# Patient Record
Sex: MALE
[De-identification: names, ages, dates, MRNs, and addresses within clinical notes are randomized per-mention and may not be internally consistent; named-entity substitution may affect disease eponyms.]

---

## 2019-04-09 PROBLEM — Z00.00 ENCOUNTER FOR PREVENTIVE HEALTH EXAMINATION: Status: ACTIVE | Noted: 2019-04-09

## 2019-05-28 ENCOUNTER — APPOINTMENT (OUTPATIENT)
Dept: PULMONOLOGY | Facility: CLINIC | Age: 59
End: 2019-05-28
Payer: COMMERCIAL

## 2019-05-28 VITALS
RESPIRATION RATE: 15 BRPM | HEIGHT: 70 IN | DIASTOLIC BLOOD PRESSURE: 84 MMHG | HEART RATE: 81 BPM | TEMPERATURE: 97.5 F | BODY MASS INDEX: 26.92 KG/M2 | OXYGEN SATURATION: 97 % | SYSTOLIC BLOOD PRESSURE: 120 MMHG | WEIGHT: 188 LBS

## 2019-05-28 DIAGNOSIS — Z82.49 FAMILY HISTORY OF ISCHEMIC HEART DISEASE AND OTHER DISEASES OF THE CIRCULATORY SYSTEM: ICD-10-CM

## 2019-05-28 DIAGNOSIS — Z83.438 FAMILY HISTORY OF OTHER DISORDER OF LIPOPROTEIN METABOLISM AND OTHER LIPIDEMIA: ICD-10-CM

## 2019-05-28 PROCEDURE — ZZZZZ: CPT

## 2019-05-28 PROCEDURE — 94729 DIFFUSING CAPACITY: CPT

## 2019-05-28 PROCEDURE — 94060 EVALUATION OF WHEEZING: CPT

## 2019-05-28 PROCEDURE — 99204 OFFICE O/P NEW MOD 45 MIN: CPT | Mod: 25

## 2019-05-28 PROCEDURE — 94726 PLETHYSMOGRAPHY LUNG VOLUMES: CPT

## 2019-05-28 RX ORDER — FLUTICASONE PROPIONATE 50 UG/1
50 SPRAY, METERED NASAL
Qty: 1 | Refills: 5 | Status: ACTIVE | COMMUNITY
Start: 2019-05-28 | End: 1900-01-01

## 2019-05-28 RX ORDER — ALBUTEROL SULFATE 90 UG/1
108 (90 BASE) AEROSOL, METERED RESPIRATORY (INHALATION)
Qty: 1 | Refills: 2 | Status: ACTIVE | COMMUNITY
Start: 2019-05-28 | End: 1900-01-01

## 2019-05-29 ENCOUNTER — RECORD ABSTRACTING (OUTPATIENT)
Age: 59
End: 2019-05-29

## 2019-05-29 PROBLEM — Z82.49 FAMILY HISTORY OF HYPERTENSION: Status: ACTIVE | Noted: 2019-05-29

## 2019-05-29 PROBLEM — Z83.438 FAMILY HISTORY OF HYPERLIPIDEMIA: Status: ACTIVE | Noted: 2019-05-29

## 2019-05-29 NOTE — PHYSICAL EXAM
[General Appearance - Well Developed] : well developed [Normal Appearance] : normal appearance [General Appearance - Well Nourished] : well nourished [Normal Conjunctiva] : the conjunctiva exhibited no abnormalities [Neck Appearance] : the appearance of the neck was normal [Heart Rate And Rhythm] : heart rate and rhythm were normal [Heart Sounds] : normal S1 and S2 [Murmurs] : no murmurs present [] : no respiratory distress [Auscultation Breath Sounds / Voice Sounds] : lungs were clear to auscultation bilaterally [Bowel Sounds] : normal bowel sounds [Abdomen Soft] : soft [Abdomen Tenderness] : non-tender [Abnormal Walk] : normal gait [Nail Clubbing] : no clubbing of the fingernails [Cyanosis, Localized] : no localized cyanosis [Skin Color & Pigmentation] : normal skin color and pigmentation [Skin Turgor] : normal skin turgor [Motor Exam] : the motor exam was normal [No Focal Deficits] : no focal deficits [Oriented To Time, Place, And Person] : oriented to person, place, and time [Mood] : the mood was normal

## 2019-06-01 NOTE — REVIEW OF SYSTEMS
[Cough] : cough [Dyspnea] : dyspnea [Wheezing] : wheezing [Heartburn] : heartburn [Negative] : Endocrine [Vomiting] : no vomiting [Nausea] : no nausea

## 2019-06-01 NOTE — HISTORY OF PRESENT ILLNESS
[FreeTextEntry1] :  #[id number]\par 58M hx ?asthma, GERD, who comes for initial evaluation. Most recently given diagnosis of asthma. He started having SOB in March with cough and occasional wheezing. Seen at [facility] on 3/18 and given diagnosis of asthma. Treated with proair, zpack, prednisone, nebulizers. He continued to have worsening SOB and MOREL. He went to [facility] on 5/2-5 for presumed asthma exacerbation and was treated with steroids. CT Chest reportedly negative  Discharged with symbicort, montelukast, and prednisone (finished 10 days ago). Now feeling a little better but continues to have some SOB and feels that he cannot take in a deep breath, coughing. Triggers include change in temperature. Has some postnasal drip. Did not have issues with breathing prior to this year. Smoked 1/2 ppd x 30 years.

## 2019-06-01 NOTE — ASSESSMENT
[FreeTextEntry1] : 58M hx ?asthma, GERD who comes for initial evaluation. Since March 2019 has gone through 2 courses of steroids for SOB. Now on montelukast and symbicort. Continues to have coughing and some SOB. +GERD symptoms. Unclear what caused his symptoms. PFTs do not show evidence obstructive disease though he took his symbicort prior to testing. It would be unusual to have asthma at this age - pt c/o allergy and GERD symptoms so perhaps this is contributing to his symptoms of SOB. \par - refill symbicort 80-4.5, will try to continue to step down especially when allergy season ends\par - refill montelukast\par - refill flonase\par - will give albuterol prn\par - omeprazole\par - reviewed inhaler technique with patient, provided spacer\par - pt will try and obtain CT chest from [facility]\par \par d/w Dr. Aguilar

## 2019-06-24 ENCOUNTER — RX CHANGE (OUTPATIENT)
Age: 59
End: 2019-06-24

## 2019-06-24 RX ORDER — OMEPRAZOLE 40 MG/1
40 CAPSULE, DELAYED RELEASE ORAL
Qty: 90 | Refills: 0 | Status: ACTIVE | COMMUNITY
Start: 2019-05-28 | End: 1900-01-01

## 2019-07-30 ENCOUNTER — RX CHANGE (OUTPATIENT)
Age: 59
End: 2019-07-30

## 2019-08-08 ENCOUNTER — APPOINTMENT (OUTPATIENT)
Dept: PULMONOLOGY | Facility: CLINIC | Age: 59
End: 2019-08-08

## 2019-10-21 ENCOUNTER — RX CHANGE (OUTPATIENT)
Age: 59
End: 2019-10-21

## 2019-12-03 ENCOUNTER — APPOINTMENT (OUTPATIENT)
Dept: PULMONOLOGY | Facility: CLINIC | Age: 59
End: 2019-12-03
Payer: COMMERCIAL

## 2019-12-03 VITALS
HEIGHT: 70 IN | TEMPERATURE: 97.4 F | SYSTOLIC BLOOD PRESSURE: 127 MMHG | WEIGHT: 200 LBS | BODY MASS INDEX: 28.63 KG/M2 | DIASTOLIC BLOOD PRESSURE: 85 MMHG | RESPIRATION RATE: 15 BRPM | OXYGEN SATURATION: 94 % | HEART RATE: 83 BPM

## 2019-12-03 PROCEDURE — 90686 IIV4 VACC NO PRSV 0.5 ML IM: CPT

## 2019-12-03 PROCEDURE — G0008: CPT

## 2019-12-03 PROCEDURE — 99214 OFFICE O/P EST MOD 30 MIN: CPT | Mod: 25

## 2019-12-03 NOTE — HISTORY OF PRESENT ILLNESS
[FreeTextEntry1] : \par  59-year-old male with a recent diagnosis of asthma. He also has GERD and is a former tobacco user half pack a day for 30 years. He was initially evaluated by me in May of 2019. At that time he was treated with Symbicort, omeprazole albuterol fluticasone and montelukast.\par \par PFTs were normal.\par \par Returns for followup today. He is feeling very well on the Symbicort. He denies any recent asthma exacerbations. He denies cough wheezing or shortness of breath. He has used his rescue inhaler 2 or 3 times in 6 months. Not having any allergy symptoms currently he denies GERD.

## 2019-12-03 NOTE — ASSESSMENT
[FreeTextEntry1] : 59 year old male with asthma, former tobacco user, normal PFTs, asthma currently well controlled.  \par \par Plan:\par \par 1- Renewed Symbicort, montelukast and proair\par 2- F/U in 6 months\par 3- flu vaccine given today.\par \par ON follow up will discuss wether patient qualifies for lung cancer screening CT Chest.

## 2019-12-03 NOTE — REVIEW OF SYSTEMS
[As Noted in HPI] : as noted in HPI [Cough] : no cough [Dyspnea] : no dyspnea [Wheezing] : no wheezing [Heartburn] : no heartburn [Vomiting] : no vomiting [Nausea] : no nausea [Negative] : Gastrointestinal

## 2019-12-03 NOTE — PHYSICAL EXAM
[General Appearance - Well Developed] : well developed [General Appearance - Well Nourished] : well nourished [Normal Appearance] : normal appearance [Normal Conjunctiva] : the conjunctiva exhibited no abnormalities [Neck Appearance] : the appearance of the neck was normal [Heart Sounds] : normal S1 and S2 [Murmurs] : no murmurs present [Heart Rate And Rhythm] : heart rate and rhythm were normal [] : no respiratory distress [Auscultation Breath Sounds / Voice Sounds] : lungs were clear to auscultation bilaterally [Bowel Sounds] : normal bowel sounds [Abdomen Tenderness] : non-tender [Abdomen Soft] : soft [Cyanosis, Localized] : no localized cyanosis [Nail Clubbing] : no clubbing of the fingernails [Abnormal Walk] : normal gait [Motor Exam] : the motor exam was normal [Oriented To Time, Place, And Person] : oriented to person, place, and time [No Focal Deficits] : no focal deficits [Mood] : the mood was normal [Skin Color & Pigmentation] : normal skin color and pigmentation [Skin Turgor] : normal skin turgor [III] : III

## 2020-06-24 ENCOUNTER — APPOINTMENT (OUTPATIENT)
Dept: PULMONOLOGY | Facility: CLINIC | Age: 60
End: 2020-06-24

## 2020-07-20 RX ORDER — MONTELUKAST 10 MG/1
10 TABLET, FILM COATED ORAL
Qty: 1 | Refills: 0 | Status: ACTIVE | COMMUNITY
Start: 1900-01-01 | End: 1900-01-01

## 2020-07-21 RX ORDER — MONTELUKAST 10 MG/1
10 TABLET, FILM COATED ORAL
Qty: 1 | Refills: 3 | Status: ACTIVE | COMMUNITY
Start: 2019-05-28 | End: 1900-01-01

## 2020-08-20 ENCOUNTER — APPOINTMENT (OUTPATIENT)
Dept: PULMONOLOGY | Facility: CLINIC | Age: 60
End: 2020-08-20
Payer: COMMERCIAL

## 2020-08-20 DIAGNOSIS — Z87.891 PERSONAL HISTORY OF NICOTINE DEPENDENCE: ICD-10-CM

## 2020-08-20 DIAGNOSIS — J45.909 UNSPECIFIED ASTHMA, UNCOMPLICATED: ICD-10-CM

## 2020-08-20 DIAGNOSIS — J30.9 ALLERGIC RHINITIS, UNSPECIFIED: ICD-10-CM

## 2020-08-20 PROCEDURE — 99214 OFFICE O/P EST MOD 30 MIN: CPT | Mod: 95

## 2020-08-20 RX ORDER — BUDESONIDE AND FORMOTEROL FUMARATE DIHYDRATE 80; 4.5 UG/1; UG/1
80-4.5 AEROSOL RESPIRATORY (INHALATION) TWICE DAILY
Qty: 3 | Refills: 2 | Status: ACTIVE | COMMUNITY
Start: 2019-12-03 | End: 1900-01-01

## 2020-08-20 NOTE — PHYSICAL EXAM
[General Appearance - Well Developed] : well developed [Normal Appearance] : normal appearance [General Appearance - Well Nourished] : well nourished [Normal Conjunctiva] : the conjunctiva exhibited no abnormalities [III] : III [Neck Appearance] : the appearance of the neck was normal [Heart Rate And Rhythm] : heart rate and rhythm were normal [Heart Sounds] : normal S1 and S2 [Murmurs] : no murmurs present [Auscultation Breath Sounds / Voice Sounds] : lungs were clear to auscultation bilaterally [] : no respiratory distress [Abdomen Soft] : soft [Bowel Sounds] : normal bowel sounds [Abnormal Walk] : normal gait [Abdomen Tenderness] : non-tender [Nail Clubbing] : no clubbing of the fingernails [Cyanosis, Localized] : no localized cyanosis [Motor Exam] : the motor exam was normal [No Focal Deficits] : no focal deficits [Oriented To Time, Place, And Person] : oriented to person, place, and time [Mood] : the mood was normal [Skin Turgor] : normal skin turgor [Skin Color & Pigmentation] : normal skin color and pigmentation

## 2020-08-20 NOTE — HISTORY OF PRESENT ILLNESS
[FreeTextEntry1] : \par  59-year-old male with a recent diagnosis of asthma/bronchitis. He also has GERD and is a former tobacco userone pack a day for 35 years. He quit 10 years ago.  History obtained through his daughter who helped with translation.  \par \par He was initially evaluated by me in May of 2019. At that time he was treated with Symbicort, omeprazole albuterol fluticasone and montelukast.\par \par PFTs were normal.\par \par Felt much better on follow up a few months later- last seen on 12/3/19.  .  \par \par He is feeling well- no recent exacerbations.  NO issues during COVID.  Rarely using rescue inhaler, allergies controlled.  Complaining of joint pain- asking if it is related to any of his meds.

## 2020-08-20 NOTE — REVIEW OF SYSTEMS
[As Noted in HPI] : as noted in HPI [Negative] : Endocrine [Cough] : no cough [Wheezing] : no wheezing [Dyspnea] : no dyspnea [Heartburn] : no heartburn [Nausea] : no nausea [Vomiting] : no vomiting

## 2020-08-20 NOTE — REASON FOR VISIT
[Initial Evaluation] : an initial evaluation [Home] : at home, [unfilled] , at the time of the visit. [Medical Office: ([facility])___] : at the medical office located in  [Family Member] : family member [Verbal consent obtained from patient] : the patient, [unfilled] [Follow-Up] : a follow-up visit [COPD] : COPD

## 2020-08-20 NOTE — ASSESSMENT
[FreeTextEntry1] : 59 year old male with asthma vs COPD-, former tobacco user, normal PFTs, He is stable from the respiratory standpoint at this time.  \par \par Plan:\par \par 1- Renewed Symbicort, montelukast and proair\par 2- F/U in 6 months\par 3-Advised to receive flu vaccine\par 4- He qualifies for lung cancer screening.  We discussed pros/cons of exam and he agrees to participate.  ORder placed.  Given [facility] imaging scheduling number to daughter.  \par

## 2020-11-18 ENCOUNTER — NON-APPOINTMENT (OUTPATIENT)
Age: 60
End: 2020-11-18

## 2020-11-18 VITALS — BODY MASS INDEX: 26.48 KG/M2 | HEIGHT: 70 IN | WEIGHT: 185 LBS

## 2020-11-18 NOTE — HISTORY OF PRESENT ILLNESS
[TextBox_13] : Referred by Dr. Alyson Aguilar.\par \par Mr. OLIVARES is a 60 year old male with a history of asthma.\par \par He was called to review eligibility for Low-Dose CT lung cancer screening.  Reviewed and confirmed that the patient meets screening eligibility criteria:\par \par 60 years old \par \par Smoking Status: Former smoker\par \par Number of pack(s) per day: 1\par Number of years smoked: 35\par Number of pack years smokin\par \par Number of years since quitting smoking: 10\par Quit year: \par \par Mr. OLIVARES denies any symptoms of lung cancer, including new cough, change in cough, hemoptysis, and unintentional weight loss.\par \par Mr. OLIVARES denies any personal history of lung cancer.  No lung cancer in a first degree relative.

## 2020-11-23 ENCOUNTER — RESULT REVIEW (OUTPATIENT)
Age: 60
End: 2020-11-23

## 2020-11-23 ENCOUNTER — APPOINTMENT (OUTPATIENT)
Dept: CT IMAGING | Facility: CLINIC | Age: 60
End: 2020-11-23
Payer: COMMERCIAL

## 2020-11-23 ENCOUNTER — OUTPATIENT (OUTPATIENT)
Dept: OUTPATIENT SERVICES | Facility: HOSPITAL | Age: 60
LOS: 1 days | End: 2020-11-23
Payer: MEDICAID

## 2020-11-23 DIAGNOSIS — Z87.891 PERSONAL HISTORY OF NICOTINE DEPENDENCE: ICD-10-CM

## 2020-11-23 PROCEDURE — 71271 CT THORAX LUNG CANCER SCR C-: CPT

## 2020-11-23 PROCEDURE — G0297: CPT | Mod: 26

## 2020-11-27 ENCOUNTER — NON-APPOINTMENT (OUTPATIENT)
Age: 60
End: 2020-11-27

## 2021-02-19 ENCOUNTER — RX RENEWAL (OUTPATIENT)
Age: 61
End: 2021-02-19

## 2021-02-19 RX ORDER — BUDESONIDE AND FORMOTEROL FUMARATE DIHYDRATE 80; 4.5 UG/1; UG/1
80-4.5 AEROSOL RESPIRATORY (INHALATION) TWICE DAILY
Qty: 1 | Refills: 1 | Status: ACTIVE | COMMUNITY
Start: 2021-02-19 | End: 1900-01-01

## 2021-03-16 ENCOUNTER — NON-APPOINTMENT (OUTPATIENT)
Age: 61
End: 2021-03-16

## 2021-04-14 ENCOUNTER — RX RENEWAL (OUTPATIENT)
Age: 61
End: 2021-04-14

## 2021-04-19 ENCOUNTER — RX RENEWAL (OUTPATIENT)
Age: 61
End: 2021-04-19

## 2021-06-29 ENCOUNTER — APPOINTMENT (OUTPATIENT)
Dept: PULMONOLOGY | Facility: CLINIC | Age: 61
End: 2021-06-29
Payer: COMMERCIAL

## 2021-06-29 VITALS
TEMPERATURE: 97.9 F | OXYGEN SATURATION: 98 % | DIASTOLIC BLOOD PRESSURE: 83 MMHG | HEART RATE: 90 BPM | BODY MASS INDEX: 27.35 KG/M2 | HEIGHT: 70 IN | WEIGHT: 191 LBS | SYSTOLIC BLOOD PRESSURE: 125 MMHG

## 2021-06-29 PROCEDURE — G0296 VISIT TO DETERM LDCT ELIG: CPT

## 2021-06-29 PROCEDURE — 99214 OFFICE O/P EST MOD 30 MIN: CPT | Mod: 25

## 2021-06-29 RX ORDER — MONTELUKAST 10 MG/1
10 TABLET, FILM COATED ORAL
Qty: 30 | Refills: 5 | Status: ACTIVE | COMMUNITY
Start: 2019-12-03 | End: 1900-01-01

## 2021-06-29 RX ORDER — ALBUTEROL SULFATE 90 UG/1
108 (90 BASE) INHALANT RESPIRATORY (INHALATION)
Qty: 2 | Refills: 5 | Status: ACTIVE | COMMUNITY
Start: 2019-12-03 | End: 1900-01-01

## 2021-06-29 NOTE — ASSESSMENT
[FreeTextEntry1] : 59 year old male with asthma vs COPD-, former tobacco user, normal PFTs, He is stable from the respiratory standpoint at this time on his current bronchodilator regimen.\par Also reports diffuse joint pain. \par ON PE VSS Lungs are clear there is no edema.\par \par Plan:\par \par 1- Renewed Symbicort, montelukast and proair\par 2- F/U in 6 months\par 3-Lung cancer screening exam ordered for NOvember 2021.

## 2021-06-29 NOTE — HISTORY OF PRESENT ILLNESS
[FreeTextEntry1] : \par  60-year-old male with a recent diagnosis of asthma/bronchitis. He also has GERD and is a former tobacco userone pack a day for 35 years. He quit 10 years ago.  History obtained through his daughter who helped with translation.  \par \par He was initially evaluated by me in May of 2019. At that time he was treated with Symbicort, omeprazole albuterol fluticasone and montelukast.\par \par PFTs were normal.\par \par Felt much better on follow up a few months later- last seen by televisit one year ago.  I renewed symbicort, montelukast and proair at that time.    \par Had lung cancer screening CT in november 2020 which was BIRADS 2.  F/U was recommended in one year.  Report as follows:  PERSONALLY REVIEWED BY ME AS WELL.\par \par Patent central airways. Mild emphysema. Calcified granulomas within left upper lobe and right middle lobe. 2 mm noncalcified nodules within the left upper lobe (series 3 image 42, 60). 2 mm nodules within the left lower lobe (series 3 images 77 and 117). Triangular fissural nodule within the right middle lobe representing benign intrapulmonary lymph node. Lungs are otherwise clear.\par \par Returns for follow up today: \par Breathing is fine- denies dyspnea, cough or wheezing.  Only has issue with change in seasons.  he is using his fluticasone at those times as well as Symbicort and montelukast.  \par Reports bilateral symmetric joint pain. \par \par Received second dose of Pfeizer vaccine in May 2021

## 2021-06-29 NOTE — PHYSICAL EXAM
[No Acute Distress] : no acute distress [Normal Appearance] : normal appearance [No Neck Mass] : no neck mass [Normal Rate/Rhythm] : normal rate/rhythm [Normal S1, S2] : normal s1, s2 [No Murmurs] : no murmurs [No Resp Distress] : no resp distress [Clear to Auscultation Bilaterally] : clear to auscultation bilaterally [No Abnormalities] : no abnormalities [Normal Gait] : normal gait [No Clubbing] : no clubbing [No Cyanosis] : no cyanosis [No Edema] : no edema [Normal Color/ Pigmentation] : normal color/ pigmentation [No Focal Deficits] : no focal deficits [Oriented x3] : oriented x3 [Normal Affect] : normal affect

## 2021-12-09 ENCOUNTER — NON-APPOINTMENT (OUTPATIENT)
Age: 61
End: 2021-12-09

## 2021-12-09 DIAGNOSIS — Z87.891 PERSONAL HISTORY OF NICOTINE DEPENDENCE: ICD-10-CM

## 2021-12-11 VITALS — WEIGHT: 191 LBS | HEIGHT: 70 IN | BODY MASS INDEX: 27.35 KG/M2

## 2021-12-11 PROBLEM — Z87.891 FORMER CIGARETTE SMOKER: Status: ACTIVE | Noted: 2020-11-18

## 2021-12-11 NOTE — HISTORY OF PRESENT ILLNESS
[TextBox_13] : Referred by Dr. Alyson Aguilar.\par \par Mr. OLIVARES is a 61 year old male with a history of asthma.\par \par He was called to review eligibility for Low-Dose CT lung cancer screening.  Reviewed and confirmed that the patient meets screening eligibility criteria:\par \par 61 years old \par \par Smoking Status: Former smoker\par \par Number of pack(s) per day: 1\par Number of years smoked: 35\par Number of pack years smokin\par \par Number of years since quitting smokin\par Quit year: \par \par Mr. OLIVARES denies any symptoms of lung cancer, including new cough, change in cough, hemoptysis, and unintentional weight loss.\par \par Mr. OLIVARES denies any personal history of lung cancer.  No lung cancer in a first degree relative.  Denies any history of lung disease or any history of occupational exposures.

## 2021-12-13 ENCOUNTER — APPOINTMENT (OUTPATIENT)
Dept: CT IMAGING | Facility: CLINIC | Age: 61
End: 2021-12-13
Payer: COMMERCIAL

## 2021-12-13 ENCOUNTER — RESULT REVIEW (OUTPATIENT)
Age: 61
End: 2021-12-13

## 2021-12-13 ENCOUNTER — OUTPATIENT (OUTPATIENT)
Dept: OUTPATIENT SERVICES | Facility: HOSPITAL | Age: 61
LOS: 1 days | End: 2021-12-13
Payer: MEDICAID

## 2021-12-13 DIAGNOSIS — Z87.891 PERSONAL HISTORY OF NICOTINE DEPENDENCE: ICD-10-CM

## 2021-12-13 PROCEDURE — 71271 CT THORAX LUNG CANCER SCR C-: CPT | Mod: 26

## 2021-12-13 PROCEDURE — 71271 CT THORAX LUNG CANCER SCR C-: CPT

## 2022-01-03 ENCOUNTER — RX RENEWAL (OUTPATIENT)
Age: 62
End: 2022-01-03

## 2022-01-06 DIAGNOSIS — Z23 ENCOUNTER FOR IMMUNIZATION: ICD-10-CM

## 2022-03-02 ENCOUNTER — RX RENEWAL (OUTPATIENT)
Age: 62
End: 2022-03-02

## 2022-06-30 ENCOUNTER — RX RENEWAL (OUTPATIENT)
Age: 62
End: 2022-06-30

## 2022-06-30 RX ORDER — MONTELUKAST 10 MG/1
10 TABLET, FILM COATED ORAL
Qty: 90 | Refills: 0 | Status: ACTIVE | COMMUNITY
Start: 2020-08-20 | End: 1900-01-01

## 2022-07-11 ENCOUNTER — RX RENEWAL (OUTPATIENT)
Age: 62
End: 2022-07-11

## 2022-07-18 ENCOUNTER — OFFICE (OUTPATIENT)
Dept: URBAN - METROPOLITAN AREA CLINIC 109 | Facility: CLINIC | Age: 62
Setting detail: OPHTHALMOLOGY
End: 2022-07-18
Payer: COMMERCIAL

## 2022-07-18 DIAGNOSIS — H40.003: ICD-10-CM

## 2022-07-18 PROCEDURE — 92133 CPTRZD OPH DX IMG PST SGM ON: CPT | Performed by: OPHTHALMOLOGY

## 2022-07-18 PROCEDURE — 92020 GONIOSCOPY: CPT | Performed by: OPHTHALMOLOGY

## 2022-07-18 PROCEDURE — 99203 OFFICE O/P NEW LOW 30 MIN: CPT | Performed by: OPHTHALMOLOGY

## 2022-07-18 PROCEDURE — 92083 EXTENDED VISUAL FIELD XM: CPT | Performed by: OPHTHALMOLOGY

## 2022-07-18 ASSESSMENT — KERATOMETRY
OD_K2POWER_DIOPTERS: 41.00
OS_K1POWER_DIOPTERS: 40.75
OD_AXISANGLE_DEGREES: 093
OD_K1POWER_DIOPTERS: 40.75
OS_AXISANGLE_DEGREES: 090
OS_K2POWER_DIOPTERS: 41.00

## 2022-07-18 ASSESSMENT — REFRACTION_AUTOREFRACTION
OD_AXIS: 074
OS_SPHERE: +0.75
OD_SPHERE: +1.75
OD_CYLINDER: -1.00

## 2022-07-18 ASSESSMENT — REFRACTION_MANIFEST
OD_SPHERE: +1.75
OD_CYLINDER: -0.50
OD_AXIS: 75
OS_SPHERE: +1.00

## 2022-07-18 ASSESSMENT — SUPERFICIAL PUNCTATE KERATITIS (SPK)
OS_SPK: T
OD_SPK: T

## 2022-07-18 ASSESSMENT — TONOMETRY
OS_IOP_MMHG: 17
OD_IOP_MMHG: 16

## 2022-07-18 ASSESSMENT — TEAR BREAK UP TIME (TBUT)
OS_TBUT: T
OD_TBUT: T

## 2022-07-18 ASSESSMENT — AXIALLENGTH_DERIVED
OD_AL: 24.0789
OD_AL: 23.9779

## 2022-07-18 ASSESSMENT — CONFRONTATIONAL VISUAL FIELD TEST (CVF)
OD_FINDINGS: FULL
OS_FINDINGS: FULL

## 2022-07-18 ASSESSMENT — SPHEQUIV_DERIVED
OD_SPHEQUIV: 1.5
OD_SPHEQUIV: 1.25

## 2022-07-18 ASSESSMENT — VISUAL ACUITY
OS_BCVA: 20/50
OD_BCVA: 20/30-2

## 2022-09-28 ENCOUNTER — RX RENEWAL (OUTPATIENT)
Age: 62
End: 2022-09-28

## 2022-11-20 ENCOUNTER — RX RENEWAL (OUTPATIENT)
Age: 62
End: 2022-11-20

## 2023-04-07 ENCOUNTER — RX RENEWAL (OUTPATIENT)
Age: 63
End: 2023-04-07

## 2023-08-15 ENCOUNTER — RX RENEWAL (OUTPATIENT)
Age: 63
End: 2023-08-15

## 2023-08-15 RX ORDER — BUDESONIDE AND FORMOTEROL FUMARATE DIHYDRATE 80; 4.5 UG/1; UG/1
80-4.5 AEROSOL RESPIRATORY (INHALATION)
Qty: 1 | Refills: 3 | Status: ACTIVE | COMMUNITY
Start: 2019-05-28 | End: 1900-01-01

## 2024-10-28 ENCOUNTER — NON-APPOINTMENT (OUTPATIENT)
Age: 64
End: 2024-10-28

## 2024-12-09 ENCOUNTER — APPOINTMENT (OUTPATIENT)
Dept: ORTHOPEDIC SURGERY | Facility: CLINIC | Age: 64
End: 2024-12-09

## 2024-12-09 VITALS — WEIGHT: 200 LBS | HEIGHT: 70 IN | BODY MASS INDEX: 28.63 KG/M2

## 2024-12-09 VITALS — BODY MASS INDEX: 28.7 KG/M2 | WEIGHT: 200 LBS

## 2024-12-11 ENCOUNTER — APPOINTMENT (OUTPATIENT)
Dept: PULMONOLOGY | Facility: CLINIC | Age: 64
End: 2024-12-11

## 2024-12-11 VITALS
RESPIRATION RATE: 16 BRPM | HEART RATE: 84 BPM | DIASTOLIC BLOOD PRESSURE: 80 MMHG | SYSTOLIC BLOOD PRESSURE: 124 MMHG | OXYGEN SATURATION: 94 % | BODY MASS INDEX: 27.44 KG/M2 | HEIGHT: 71 IN | WEIGHT: 196 LBS

## 2024-12-11 DIAGNOSIS — R91.1 SOLITARY PULMONARY NODULE: ICD-10-CM

## 2024-12-11 PROCEDURE — G2211 COMPLEX E/M VISIT ADD ON: CPT

## 2024-12-11 PROCEDURE — 99203 OFFICE O/P NEW LOW 30 MIN: CPT

## 2024-12-11 RX ORDER — GUSELKUMAB 100 MG/ML
100 INJECTION SUBCUTANEOUS
Qty: 1 | Refills: 0 | Status: ACTIVE | COMMUNITY
Start: 2024-11-15

## 2024-12-12 ENCOUNTER — APPOINTMENT (OUTPATIENT)
Dept: ORTHOPEDIC SURGERY | Facility: CLINIC | Age: 64
End: 2024-12-12
Payer: OTHER MISCELLANEOUS

## 2024-12-12 VITALS — BODY MASS INDEX: 27.44 KG/M2 | HEIGHT: 71 IN | WEIGHT: 196 LBS

## 2024-12-12 DIAGNOSIS — G57.82 OTHER SPECIFIED MONONEUROPATHIES OF LEFT LOWER LIMB: ICD-10-CM

## 2024-12-12 DIAGNOSIS — S93.602A UNSPECIFIED SPRAIN OF LEFT FOOT, INITIAL ENCOUNTER: ICD-10-CM

## 2024-12-12 DIAGNOSIS — M19.072 PRIMARY OSTEOARTHRITIS, LEFT ANKLE AND FOOT: ICD-10-CM
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
## 2024-12-12 DIAGNOSIS — M79.672 PAIN IN LEFT FOOT: ICD-10-CM

## 2024-12-12 DIAGNOSIS — M72.2 PLANTAR FASCIAL FIBROMATOSIS: ICD-10-CM

## 2024-12-12 PROCEDURE — 99204 OFFICE O/P NEW MOD 45 MIN: CPT

## 2025-01-31 ENCOUNTER — NON-APPOINTMENT (OUTPATIENT)
Age: 65
End: 2025-01-31

## 2025-03-03 ENCOUNTER — APPOINTMENT (OUTPATIENT)
Dept: ORTHOPEDIC SURGERY | Facility: CLINIC | Age: 65
End: 2025-03-03
Payer: OTHER MISCELLANEOUS

## 2025-03-03 DIAGNOSIS — M19.072 PRIMARY OSTEOARTHRITIS, LEFT ANKLE AND FOOT: ICD-10-CM

## 2025-03-03 DIAGNOSIS — M79.672 PAIN IN LEFT FOOT: ICD-10-CM

## 2025-03-03 DIAGNOSIS — S93.602A UNSPECIFIED SPRAIN OF LEFT FOOT, INITIAL ENCOUNTER: ICD-10-CM

## 2025-03-03 DIAGNOSIS — G57.82 OTHER SPECIFIED MONONEUROPATHIES OF LEFT LOWER LIMB: ICD-10-CM

## 2025-03-03 DIAGNOSIS — M72.2 PLANTAR FASCIAL FIBROMATOSIS: ICD-10-CM
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
## 2025-03-03 PROCEDURE — 99213 OFFICE O/P EST LOW 20 MIN: CPT

## 2025-04-02 ENCOUNTER — APPOINTMENT (OUTPATIENT)
Dept: ORTHOPEDIC SURGERY | Facility: CLINIC | Age: 65
End: 2025-04-02
Payer: OTHER MISCELLANEOUS

## 2025-04-02 DIAGNOSIS — S93.602A UNSPECIFIED SPRAIN OF LEFT FOOT, INITIAL ENCOUNTER: ICD-10-CM
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
## 2025-04-02 DIAGNOSIS — M72.2 PLANTAR FASCIAL FIBROMATOSIS: ICD-10-CM

## 2025-04-02 DIAGNOSIS — M79.672 PAIN IN LEFT FOOT: ICD-10-CM

## 2025-04-02 DIAGNOSIS — M19.072 PRIMARY OSTEOARTHRITIS, LEFT ANKLE AND FOOT: ICD-10-CM

## 2025-04-02 DIAGNOSIS — G57.82 OTHER SPECIFIED MONONEUROPATHIES OF LEFT LOWER LIMB: ICD-10-CM

## 2025-04-02 PROCEDURE — 99213 OFFICE O/P EST LOW 20 MIN: CPT

## 2025-05-14 ENCOUNTER — APPOINTMENT (OUTPATIENT)
Dept: PULMONOLOGY | Facility: CLINIC | Age: 65
End: 2025-05-14

## 2025-05-14 VITALS — BODY MASS INDEX: 28.77 KG/M2 | WEIGHT: 201 LBS | HEIGHT: 70 IN

## 2025-05-14 VITALS
OXYGEN SATURATION: 97 % | SYSTOLIC BLOOD PRESSURE: 122 MMHG | RESPIRATION RATE: 16 BRPM | DIASTOLIC BLOOD PRESSURE: 80 MMHG | HEART RATE: 77 BPM

## 2025-05-14 DIAGNOSIS — J45.909 UNSPECIFIED ASTHMA, UNCOMPLICATED: ICD-10-CM

## 2025-05-14 PROCEDURE — 94729 DIFFUSING CAPACITY: CPT

## 2025-05-14 PROCEDURE — 94010 BREATHING CAPACITY TEST: CPT

## 2025-05-14 PROCEDURE — 94727 GAS DIL/WSHOT DETER LNG VOL: CPT

## 2025-05-14 PROCEDURE — 99213 OFFICE O/P EST LOW 20 MIN: CPT | Mod: 25

## 2025-05-14 PROCEDURE — 85018 HEMOGLOBIN: CPT | Mod: QW

## 2025-05-14 RX ORDER — BUDESONIDE AND FORMOTEROL FUMARATE DIHYDRATE 80; 4.5 UG/1; UG/1
80-4.5 AEROSOL RESPIRATORY (INHALATION) TWICE DAILY
Qty: 3 | Refills: 4 | Status: ACTIVE | COMMUNITY
Start: 2025-05-14 | End: 1900-01-01